# Patient Record
Sex: FEMALE | Race: WHITE | NOT HISPANIC OR LATINO
[De-identification: names, ages, dates, MRNs, and addresses within clinical notes are randomized per-mention and may not be internally consistent; named-entity substitution may affect disease eponyms.]

---

## 2018-01-23 PROBLEM — Z00.00 ENCOUNTER FOR PREVENTIVE HEALTH EXAMINATION: Status: ACTIVE | Noted: 2018-01-23

## 2018-01-25 ENCOUNTER — APPOINTMENT (OUTPATIENT)
Dept: HEART AND VASCULAR | Facility: CLINIC | Age: 68
End: 2018-01-25
Payer: COMMERCIAL

## 2018-01-25 VITALS
DIASTOLIC BLOOD PRESSURE: 70 MMHG | HEART RATE: 70 BPM | HEIGHT: 64 IN | SYSTOLIC BLOOD PRESSURE: 122 MMHG | BODY MASS INDEX: 23.9 KG/M2 | WEIGHT: 140 LBS

## 2018-01-25 DIAGNOSIS — Z92.21 PERSONAL HISTORY OF ANTINEOPLASTIC CHEMOTHERAPY: ICD-10-CM

## 2018-01-25 DIAGNOSIS — E78.5 HYPERLIPIDEMIA, UNSPECIFIED: ICD-10-CM

## 2018-01-25 DIAGNOSIS — C50.912 MALIGNANT NEOPLASM OF UNSPECIFIED SITE OF LEFT FEMALE BREAST: ICD-10-CM

## 2018-01-25 DIAGNOSIS — Z87.891 PERSONAL HISTORY OF NICOTINE DEPENDENCE: ICD-10-CM

## 2018-01-25 DIAGNOSIS — I10 ESSENTIAL (PRIMARY) HYPERTENSION: ICD-10-CM

## 2018-01-25 DIAGNOSIS — M81.0 AGE-RELATED OSTEOPOROSIS W/OUT CURRENT PATHOLOGICAL FRACTURE: ICD-10-CM

## 2018-01-25 DIAGNOSIS — K58.9 IRRITABLE BOWEL SYNDROME W/OUT DIARRHEA: ICD-10-CM

## 2018-01-25 DIAGNOSIS — I25.10 ATHEROSCLEROTIC HEART DISEASE OF NATIVE CORONARY ARTERY W/OUT ANGINA PECTORIS: ICD-10-CM

## 2018-01-25 DIAGNOSIS — T66.XXXA RADIATION SICKNESS, UNSPECIFIED, INITIAL ENCOUNTER: ICD-10-CM

## 2018-01-25 PROCEDURE — 93000 ELECTROCARDIOGRAM COMPLETE: CPT

## 2018-01-25 PROCEDURE — 82043 UR ALBUMIN QUANTITATIVE: CPT | Mod: QW

## 2018-01-25 PROCEDURE — 99205 OFFICE O/P NEW HI 60 MIN: CPT | Mod: 25

## 2018-01-29 LAB
CREAT SPEC-SCNC: 84 MG/DL
MICROALBUMIN 24H UR DL<=1MG/L-MCNC: 2.1 MG/DL
MICROALBUMIN/CREAT 24H UR-RTO: 25 MG/G

## 2018-02-05 ENCOUNTER — CLINICAL ADVICE (OUTPATIENT)
Age: 68
End: 2018-02-05

## 2018-02-06 ENCOUNTER — TRANSCRIPTION ENCOUNTER (OUTPATIENT)
Age: 68
End: 2018-02-06

## 2018-02-06 ENCOUNTER — OUTPATIENT (OUTPATIENT)
Dept: OUTPATIENT SERVICES | Facility: HOSPITAL | Age: 68
LOS: 1 days | End: 2018-02-06
Payer: COMMERCIAL

## 2018-02-06 DIAGNOSIS — I35.9 NONRHEUMATIC AORTIC VALVE DISORDER, UNSPECIFIED: ICD-10-CM

## 2018-02-06 DIAGNOSIS — I25.9 CHRONIC ISCHEMIC HEART DISEASE, UNSPECIFIED: ICD-10-CM

## 2018-02-06 PROCEDURE — 93306 TTE W/DOPPLER COMPLETE: CPT

## 2018-02-06 PROCEDURE — A9500: CPT

## 2018-02-06 PROCEDURE — 93016 CV STRESS TEST SUPVJ ONLY: CPT

## 2018-02-06 PROCEDURE — 93306 TTE W/DOPPLER COMPLETE: CPT | Mod: 26

## 2018-02-06 PROCEDURE — 93017 CV STRESS TEST TRACING ONLY: CPT

## 2018-02-06 PROCEDURE — 78452 HT MUSCLE IMAGE SPECT MULT: CPT | Mod: 26

## 2018-02-06 PROCEDURE — 93018 CV STRESS TEST I&R ONLY: CPT

## 2018-02-06 PROCEDURE — A9505: CPT

## 2018-02-06 PROCEDURE — 78452 HT MUSCLE IMAGE SPECT MULT: CPT

## 2018-02-14 ENCOUNTER — APPOINTMENT (OUTPATIENT)
Dept: HEART AND VASCULAR | Facility: CLINIC | Age: 68
End: 2018-02-14

## 2018-02-14 ENCOUNTER — APPOINTMENT (OUTPATIENT)
Dept: CARDIOTHORACIC SURGERY | Facility: CLINIC | Age: 68
End: 2018-02-14
Payer: COMMERCIAL

## 2018-02-14 VITALS
DIASTOLIC BLOOD PRESSURE: 72 MMHG | RESPIRATION RATE: 19 BRPM | OXYGEN SATURATION: 95 % | HEIGHT: 63 IN | SYSTOLIC BLOOD PRESSURE: 131 MMHG | WEIGHT: 138 LBS | HEART RATE: 88 BPM | TEMPERATURE: 97.7 F | BODY MASS INDEX: 24.45 KG/M2

## 2018-02-14 VITALS
SYSTOLIC BLOOD PRESSURE: 131 MMHG | HEIGHT: 63 IN | BODY MASS INDEX: 23.39 KG/M2 | HEART RATE: 88 BPM | RESPIRATION RATE: 19 BRPM | WEIGHT: 132 LBS | DIASTOLIC BLOOD PRESSURE: 72 MMHG | OXYGEN SATURATION: 95 %

## 2018-02-14 DIAGNOSIS — Z86.39 PERSONAL HISTORY OF OTHER ENDOCRINE, NUTRITIONAL AND METABOLIC DISEASE: ICD-10-CM

## 2018-02-14 DIAGNOSIS — Q21.1 ATRIAL SEPTAL DEFECT: ICD-10-CM

## 2018-02-14 DIAGNOSIS — R73.03 PREDIABETES.: ICD-10-CM

## 2018-02-14 DIAGNOSIS — I50.32 CHRONIC DIASTOLIC (CONGESTIVE) HEART FAILURE: ICD-10-CM

## 2018-02-14 DIAGNOSIS — Z87.19 PERSONAL HISTORY OF OTHER DISEASES OF THE DIGESTIVE SYSTEM: ICD-10-CM

## 2018-02-14 DIAGNOSIS — I35.0 NONRHEUMATIC AORTIC (VALVE) STENOSIS: ICD-10-CM

## 2018-02-14 DIAGNOSIS — Z82.49 FAMILY HISTORY OF ISCHEMIC HEART DISEASE AND OTHER DISEASES OF THE CIRCULATORY SYSTEM: ICD-10-CM

## 2018-02-14 DIAGNOSIS — Z87.39 PERSONAL HISTORY OF OTHER DISEASES OF THE MUSCULOSKELETAL SYSTEM AND CONNECTIVE TISSUE: ICD-10-CM

## 2018-02-14 DIAGNOSIS — Z86.79 PERSONAL HISTORY OF OTHER DISEASES OF THE CIRCULATORY SYSTEM: ICD-10-CM

## 2018-02-14 DIAGNOSIS — Z78.9 OTHER SPECIFIED HEALTH STATUS: ICD-10-CM

## 2018-02-14 PROCEDURE — 99205 OFFICE O/P NEW HI 60 MIN: CPT

## 2018-02-14 PROCEDURE — 99204 OFFICE O/P NEW MOD 45 MIN: CPT

## 2018-02-14 RX ORDER — ANASTROZOLE TABLETS 1 MG/1
1 TABLET ORAL DAILY
Refills: 0 | Status: ACTIVE | COMMUNITY

## 2018-02-15 PROBLEM — I50.32 CHRONIC DIASTOLIC CONGESTIVE HEART FAILURE: Status: ACTIVE | Noted: 2018-02-15

## 2018-02-21 ENCOUNTER — FORM ENCOUNTER (OUTPATIENT)
Age: 68
End: 2018-02-21

## 2018-02-21 RX ORDER — CHLORHEXIDINE GLUCONATE 213 G/1000ML
1 SOLUTION TOPICAL ONCE
Qty: 0 | Refills: 0 | Status: DISCONTINUED | OUTPATIENT
Start: 2018-02-22 | End: 2018-03-09

## 2018-02-21 NOTE — PROGRESS NOTE ADULT - SUBJECTIVE AND OBJECTIVE BOX
[facility] Interventional Cardiology Addendum Note to Structural Heart AMBI H&P:     Attending MD: Dr Miguel Barr        S: Pt presents for Right and Left Heart Catheterization (see office H&P for detailed History).  Pt reports that today she feels ...............        Allergies      Intolerances        Current Medications:       PHYSICAL EXAM    V/S		BP:		        HR:	           RR: 		  TEMP:     General:   HEENT: NCAT, EOMI, PERRLA  NECK: No JVD, No carotid bruits B/L, +2 Carotid pulses B/L  PULM:  CTA B/L No W/R/R  CARD: RRR/Irreg, +S1 +S2,  M/R/G  ABD: ND, +BS, NT, no masses  EXT: Warm, No pedal edema  NEURO: A & O x 3, no focal neurologic deficits  PULSES:	     B	          R	      	  FEM          		           DP        PT       Right              			  No/Yes	        Bruit	          Left       	         		  No/Yes	        Bruit	   		                                                              LABS:                        EKG:    ASA _____				Mallampati class: _________	    A/P:          Sedation Plan:   ? None   ? Moderate    ?  Deep    ?  General Anesthesia   Patient Is Suitable Candidate For Sedation?     ? Yes   ? No   ? Not Applicable     Risks & benefits of procedure and sedation and risks and benefits for the alternative therapy have been explained to the patient in layman’s terms including but not limited to: allergic reaction, bleeding, infection, arrhythmia, respiratory compromise, renal and vascular compromise, limb damage, MI, CVA, emergent CABG/Vascular Surgery and death. Informed consent obtained and in chart. [facility] Interventional Cardiology Addendum Note to Structural Heart AMBI H&P:     Attending MD: Dr Miguel Barr        S:   History obtained via patient and office note     66 y/o female with FHx heart disease and PMH of  HTN, HLD, Left breast cancer s/p lumpectomy, chemotherapy, radiation to the breast/chest in 2015 (currently on oral chemotherapy), chronic diastolic heart failure with moderate to severe aortic stenosis who was referred to Dr. Barr for further evaluation of her valvular disease. The patient states for the last few months, she has noticed a slight decrease in her stamina and exercise tolerance. She is not her normal active self and feels slightly fatigue. The patient denies any overt SOB with exertion, SOB at rest, chest pain, palpitations, dizziness, syncope or LE edema. The patient recently had an ECHO on 2/6/18 which showed moderate to severe AS with AMELIA 1.0 cm2 and a mean gradient of 31 mmHg (which is increased from 21 mmHG from ECHO in 12/17). The patient also had a calcium score in 2016 which showed calcium score of 1499. In light of pt. symptoms and increase in her mean gradient from December, pt. presents for  R amd L heart cardiac catheterization for invasive measurement of the aortic valve gradient as well as assessing her coronary arteries due to her high calcium score.         Allergies      Intolerances        Current Medications:       PHYSICAL EXAM    V/S		BP:		        HR:	           RR: 		  TEMP:     General:   HEENT: NCAT, EOMI, PERRLA  NECK: No JVD, No carotid bruits B/L, +2 Carotid pulses B/L  PULM:  CTA B/L No W/R/R  CARD: RRR/Irreg, +S1 +S2,  M/R/G  ABD: ND, +BS, NT, no masses  EXT: Warm, No pedal edema  NEURO: A & O x 3, no focal neurologic deficits  PULSES:	     B	          R	      	  FEM          		           DP        PT       Right              			  No/Yes	        Bruit	          Left       	         		  No/Yes	        Bruit	   		                                                              LABS:                        EKG:    ASA _____				Mallampati class: _________	    A/P:          Sedation Plan:   ? None   ? Moderate    ?  Deep    ?  General Anesthesia   Patient Is Suitable Candidate For Sedation?     ? Yes   ? No   ? Not Applicable     Risks & benefits of procedure and sedation and risks and benefits for the alternative therapy have been explained to the patient in layman’s terms including but not limited to: allergic reaction, bleeding, infection, arrhythmia, respiratory compromise, renal and vascular compromise, limb damage, MI, CVA, emergent CABG/Vascular Surgery and death. Informed consent obtained and in chart. [facility] Interventional Cardiology Addendum Note to Structural Heart AMBI H&P:     Attending MD: Dr Miguel Barr        S:   History obtained via patient and office note     68 y/o female former smoker with FHx heart disease and PMH of  HTN, HLD, Left breast cancer s/p lumpectomy, chemotherapy, radiation to the breast/chest in 2015 (currently on oral chemotherapy), chronic diastolic heart failure with moderate to severe aortic stenosis who was referred to Dr. Barr for further evaluation of her valvular disease. The patient states for the last few months, she has noticed a slight decrease in her stamina and exercise tolerance. She is not her normal active self and feels slightly fatigue. The patient denies any overt SOB with exertion, SOB at rest, chest pain, palpitations, dizziness, syncope or LE edema. The patient recently had an ECHO on 2/6/18 which showed moderate to severe AS with AMELIA 1.0 cm2 and a mean gradient of 31 mmHg (which is increased from 21 mmHG from ECHO in 12/17). The patient also had a calcium score in 2016 which showed calcium score of 1499. In light of pt. symptoms and increase in her mean gradient from December, pt. presents for recommended R amd L heart cardiac catheterization for invasive measurement of the aortic valve gradient as well as assessing her coronary arteries due to her high calcium score.         Allergies: NKDA    Current Medications:   - Bystolic 5 mg 1 X daily  - Anastrazole 1 mg 1 X daily  - Crestor 40 mg 1 X daily  - Zetia 10 mg 1 X daily  - amitryptyline 10 mg 1 X daily   - amlodipine 10 mg 1 X daily      PHYSICAL EXAM    V/S		BP:	119/59	        HR:	76           RR: 	16	  TEMP: 97.9    General: NAD  Neck: no JVD noted  CV: RRR. S1 and S2 present; 3/6 MINDA  Pulm: CTA B/L, no W/R/R  GI: soft, NT/ND, + BS X 4  extremities: no clubbing, cyanosis and edema noted  Neuro: AO x 3, no focal deficits noted    PULSES:	     B	          R	      	  FEM          		           DP        PT       Right          2+    		2+	2+  No	        Bruit	   2+                 1+       Left       	 2+        	2+	2+   No	        Bruit	   2+		1+                                                              LABS:                          13.2   5.7   )-----------( 202      ( 22 Feb 2018 11:45 )             39.6   02-22    142  |  103  |  20  ----------------------------<  107<H>  4.9   |  23  |  1.05    Ca    9.7      22 Feb 2018 11:45        PT/INR - ( 22 Feb 2018 11:45 )   PT: 10.7 sec;   INR: 0.96          PTT - ( 22 Feb 2018 11:45 )  PTT:26.6 sec                EKG:    ASA ___3__				Mallampati class: ____3_____	    A/P:    68 y/o female former smoker with FHx heart disease and PMH of  HTN, HLD, Left breast cancer s/p lumpectomy, chemotherapy, radiation to the breast/chest in 2015 (currently on oral chemotherapy), chronic diastolic heart failure with moderate to severe aortic stenosis who was referred to Dr. Barr for further evaluation of her valvular disease. In light of pt. symptoms and increase in her mean gradient from December, pt. presents for recommended R amd L heart cardiac catheterization for invasive measurement of the aortic valve gradient as well as assessing her coronary arteries due to her high calcium score.     Pt. loaded with ASA 81 mg PO X 1 and Plavix 300 mg PO X 1 pre-cath as per Dr. Barr.      Sedation Plan:   ? None   ? Moderate    ?  Deep    ?  General Anesthesia   Patient Is Suitable Candidate For Sedation?     ? Yes   ? No   ? Not Applicable     Risks & benefits of procedure and sedation and risks and benefits for the alternative therapy have been explained to the patient in layman’s terms including but not limited to: allergic reaction, bleeding, infection, arrhythmia, respiratory compromise, renal and vascular compromise, limb damage, MI, CVA, emergent CABG/Vascular Surgery and death. Informed consent obtained and in chart. [facility] Interventional Cardiology Addendum Note to Structural Heart AMBI H&P:     Attending MD: Dr Miguel Barr        S:   History obtained via patient and office note     66 y/o female former smoker with FHx heart disease and PMH of  HTN, HLD, Left breast cancer s/p lumpectomy, chemotherapy, radiation to the breast/chest in 2015 (currently on oral chemotherapy), chronic diastolic heart failure with moderate to severe aortic stenosis who was referred to Dr. Barr for further evaluation of her valvular disease. The patient states for the last few months, she has noticed a slight decrease in her stamina and exercise tolerance. She is not her normal active self and feels slightly fatigue. The patient denies any overt SOB with exertion, SOB at rest, chest pain, palpitations, dizziness, syncope or LE edema. The patient recently had an ECHO on 2/6/18 which showed moderate to severe AS with AMELIA 1.0 cm2 and a mean gradient of 31 mmHg (which is increased from 21 mmHG from ECHO in 12/17). The patient also had a calcium score in 2016 which showed calcium score of 1499. In light of pt. symptoms and increase in her mean gradient from December, pt. presents for recommended R amd L heart cardiac catheterization for invasive measurement of the aortic valve gradient as well as assessing her coronary arteries due to her high calcium score.         Allergies: NKDA    Current Medications:   - Bystolic 5 mg 1 X daily  - Anastrazole 1 mg 1 X daily  - Crestor 40 mg 1 X daily  - Zetia 10 mg 1 X daily  - amitryptyline 10 mg 1 X daily   - amlodipine 10 mg 1 X daily      PHYSICAL EXAM    V/S		BP:	119/59	        HR:	76           RR: 	16	  TEMP: 97.9    General: NAD  Neck: no JVD noted  CV: RRR. S1 and S2 present; 3/6 MINDA  Pulm: CTA B/L, no W/R/R  GI: soft, NT/ND, + BS X 4  extremities: no clubbing, cyanosis and edema noted  Neuro: AO x 3, no focal deficits noted    PULSES:	     B	          R	      	  FEM          		           DP        PT       Right          2+    		2+	2+  No	        Bruit	   2+                 1+       Left       	 2+        	2+	2+   No	        Bruit	   2+		1+                                                              LABS:                          13.2   5.7   )-----------( 202      ( 22 Feb 2018 11:45 )             39.6   02-22    142  |  103  |  20  ----------------------------<  107<H>  4.9   |  23  |  1.05    Ca    9.7      22 Feb 2018 11:45        PT/INR - ( 22 Feb 2018 11:45 )   PT: 10.7 sec;   INR: 0.96          PTT - ( 22 Feb 2018 11:45 )  PTT:26.6 sec                EKG: NSR @ 74 bpm, no ST changes noted.     ASA ___3__				Mallampati class: ____3_____	    A/P:    66 y/o female former smoker with FHx heart disease and PMH of  HTN, HLD, Left breast cancer s/p lumpectomy, chemotherapy, radiation to the breast/chest in 2015 (currently on oral chemotherapy), chronic diastolic heart failure with moderate to severe aortic stenosis who was referred to Dr. Barr for further evaluation of her valvular disease. In light of pt. symptoms and increase in her mean gradient from December, pt. presents for recommended R amd L heart cardiac catheterization for invasive measurement of the aortic valve gradient as well as assessing her coronary arteries due to her high calcium score.     Pt. loaded with ASA 81 mg PO X 1 and Plavix 300 mg PO X 1 pre-cath as per Dr. Barr.      Sedation Plan:   ? None   ? Moderate    ?  Deep    ?  General Anesthesia   Patient Is Suitable Candidate For Sedation?     ? Yes   ? No   ? Not Applicable     Risks & benefits of procedure and sedation and risks and benefits for the alternative therapy have been explained to the patient in layman’s terms including but not limited to: allergic reaction, bleeding, infection, arrhythmia, respiratory compromise, renal and vascular compromise, limb damage, MI, CVA, emergent CABG/Vascular Surgery and death. Informed consent obtained and in chart.

## 2018-02-22 ENCOUNTER — APPOINTMENT (OUTPATIENT)
Dept: CARDIOTHORACIC SURGERY | Facility: HOSPITAL | Age: 68
End: 2018-02-22
Payer: COMMERCIAL

## 2018-02-22 ENCOUNTER — OUTPATIENT (OUTPATIENT)
Dept: OUTPATIENT SERVICES | Facility: HOSPITAL | Age: 68
LOS: 1 days | Discharge: HOME | End: 2018-02-22
Payer: COMMERCIAL

## 2018-02-22 VITALS
TEMPERATURE: 99 F | SYSTOLIC BLOOD PRESSURE: 119 MMHG | DIASTOLIC BLOOD PRESSURE: 59 MMHG | OXYGEN SATURATION: 97 % | WEIGHT: 136.03 LBS | HEART RATE: 76 BPM | HEIGHT: 63 IN | RESPIRATION RATE: 16 BRPM

## 2018-02-22 DIAGNOSIS — Z98.890 OTHER SPECIFIED POSTPROCEDURAL STATES: Chronic | ICD-10-CM

## 2018-02-22 DIAGNOSIS — I89.9 NONINFECTIVE DISORDER OF LYMPHATIC VESSELS AND LYMPH NODES, UNSPECIFIED: Chronic | ICD-10-CM

## 2018-02-22 LAB
ANION GAP SERPL CALC-SCNC: 16 MMOL/L — SIGNIFICANT CHANGE UP (ref 5–17)
APTT BLD: 26.6 SEC — LOW (ref 27.5–37.4)
BASOPHILS NFR BLD AUTO: 0.7 % — SIGNIFICANT CHANGE UP (ref 0–2)
BUN SERPL-MCNC: 20 MG/DL — SIGNIFICANT CHANGE UP (ref 7–23)
CALCIUM SERPL-MCNC: 9.7 MG/DL — SIGNIFICANT CHANGE UP (ref 8.4–10.5)
CHLORIDE SERPL-SCNC: 103 MMOL/L — SIGNIFICANT CHANGE UP (ref 96–108)
CHOLEST SERPL-MCNC: 136 MG/DL — SIGNIFICANT CHANGE UP (ref 10–199)
CO2 SERPL-SCNC: 23 MMOL/L — SIGNIFICANT CHANGE UP (ref 22–31)
CREAT SERPL-MCNC: 1.05 MG/DL — SIGNIFICANT CHANGE UP (ref 0.5–1.3)
CRP SERPL-MCNC: <0.03 MG/DL — SIGNIFICANT CHANGE UP (ref 0–0.4)
EOSINOPHIL NFR BLD AUTO: 3.1 % — SIGNIFICANT CHANGE UP (ref 0–6)
GLUCOSE SERPL-MCNC: 107 MG/DL — HIGH (ref 70–99)
HBA1C BLD-MCNC: 6 % — HIGH (ref 4–5.6)
HCT VFR BLD CALC: 39.6 % — SIGNIFICANT CHANGE UP (ref 34.5–45)
HDLC SERPL-MCNC: 94 MG/DL — SIGNIFICANT CHANGE UP (ref 40–125)
HGB BLD-MCNC: 13.2 G/DL — SIGNIFICANT CHANGE UP (ref 11.5–15.5)
INR BLD: 0.96 — SIGNIFICANT CHANGE UP (ref 0.88–1.16)
LIPID PNL WITH DIRECT LDL SERPL: 31 MG/DL — SIGNIFICANT CHANGE UP
LYMPHOCYTES # BLD AUTO: 26.4 % — SIGNIFICANT CHANGE UP (ref 13–44)
MCHC RBC-ENTMCNC: 30.5 PG — SIGNIFICANT CHANGE UP (ref 27–34)
MCHC RBC-ENTMCNC: 33.3 G/DL — SIGNIFICANT CHANGE UP (ref 32–36)
MCV RBC AUTO: 91.5 FL — SIGNIFICANT CHANGE UP (ref 80–100)
MONOCYTES NFR BLD AUTO: 12.4 % — SIGNIFICANT CHANGE UP (ref 2–14)
NEUTROPHILS NFR BLD AUTO: 57.4 % — SIGNIFICANT CHANGE UP (ref 43–77)
PLATELET # BLD AUTO: 202 K/UL — SIGNIFICANT CHANGE UP (ref 150–400)
POTASSIUM SERPL-MCNC: 4.9 MMOL/L — SIGNIFICANT CHANGE UP (ref 3.5–5.3)
POTASSIUM SERPL-SCNC: 4.9 MMOL/L — SIGNIFICANT CHANGE UP (ref 3.5–5.3)
PROTHROM AB SERPL-ACNC: 10.7 SEC — SIGNIFICANT CHANGE UP (ref 9.8–12.7)
RBC # BLD: 4.33 M/UL — SIGNIFICANT CHANGE UP (ref 3.8–5.2)
RBC # FLD: 14.8 % — SIGNIFICANT CHANGE UP (ref 10.3–16.9)
SODIUM SERPL-SCNC: 142 MMOL/L — SIGNIFICANT CHANGE UP (ref 135–145)
TOTAL CHOLESTEROL/HDL RATIO MEASUREMENT: 1.4 RATIO — LOW (ref 3.3–7.1)
TRIGL SERPL-MCNC: 54 MG/DL — SIGNIFICANT CHANGE UP (ref 10–149)
WBC # BLD: 5.7 K/UL — SIGNIFICANT CHANGE UP (ref 3.8–10.5)
WBC # FLD AUTO: 5.7 K/UL — SIGNIFICANT CHANGE UP (ref 3.8–10.5)

## 2018-02-22 PROCEDURE — 93880 EXTRACRANIAL BILAT STUDY: CPT

## 2018-02-22 PROCEDURE — C1769: CPT

## 2018-02-22 PROCEDURE — C1894: CPT

## 2018-02-22 PROCEDURE — 83036 HEMOGLOBIN GLYCOSYLATED A1C: CPT

## 2018-02-22 PROCEDURE — 80048 BASIC METABOLIC PNL TOTAL CA: CPT

## 2018-02-22 PROCEDURE — C1760: CPT

## 2018-02-22 PROCEDURE — 74174 CTA ABD&PLVS W/CONTRAST: CPT

## 2018-02-22 PROCEDURE — 85025 COMPLETE CBC W/AUTO DIFF WBC: CPT

## 2018-02-22 PROCEDURE — 75573 CT HRT C+ STRUX CGEN HRT DS: CPT | Mod: 26

## 2018-02-22 PROCEDURE — 80061 LIPID PANEL: CPT

## 2018-02-22 PROCEDURE — 93005 ELECTROCARDIOGRAM TRACING: CPT

## 2018-02-22 PROCEDURE — 36415 COLL VENOUS BLD VENIPUNCTURE: CPT

## 2018-02-22 PROCEDURE — 85610 PROTHROMBIN TIME: CPT

## 2018-02-22 PROCEDURE — 75573 CT HRT C+ STRUX CGEN HRT DS: CPT

## 2018-02-22 PROCEDURE — 93880 EXTRACRANIAL BILAT STUDY: CPT | Mod: 26

## 2018-02-22 PROCEDURE — 74174 CTA ABD&PLVS W/CONTRAST: CPT | Mod: 26

## 2018-02-22 PROCEDURE — 86140 C-REACTIVE PROTEIN: CPT

## 2018-02-22 PROCEDURE — 93456 R HRT CORONARY ARTERY ANGIO: CPT

## 2018-02-22 PROCEDURE — C1887: CPT

## 2018-02-22 PROCEDURE — 93460 R&L HRT ART/VENTRICLE ANGIO: CPT | Mod: 26

## 2018-02-22 PROCEDURE — 93010 ELECTROCARDIOGRAM REPORT: CPT

## 2018-02-22 PROCEDURE — 85730 THROMBOPLASTIN TIME PARTIAL: CPT

## 2018-02-22 NOTE — PROGRESS NOTE ADULT - SUBJECTIVE AND OBJECTIVE BOX
Interventional Cardiology PA SDA Discharge Note    Patient without complaints. Ambulated and voided without difficulties    Afebrile, VSS    Ext:    		Right Groin:   No    hematoma,  No   bruit, dressing; C/D/I  	      Pulses:    intact DP/PT to baseline     A/P:    68 y/o female former smoker with FHx heart disease and PMH of  HTN, HLD, Left breast cancer s/p lumpectomy, chemotherapy, radiation to the breast/chest in 2015 (currently on oral chemotherapy), chronic diastolic heart failure with moderate to severe aortic stenosis who was referred to Dr. Barr for further evaluation of her valvular disease. The patient states for the last few months, she has noticed a slight decrease in her stamina and exercise tolerance. She is not her normal active self and feels slightly fatigue. The patient denies any overt SOB with exertion, SOB at rest, chest pain, palpitations, dizziness, syncope or LE edema. The patient recently had an ECHO on 2/6/18 which showed moderate to severe AS with AMELIA 1.0 cm2 and a mean gradient of 31 mmHg (which is increased from 21 mmHG from ECHO in 12/17). The patient also had a calcium score in 2016 which showed calcium score of 1499. In light of pt. symptoms and increase in her mean gradient from December, pt. presents for recommended R amd L heart cardiac catheterization for invasive measurement of the aortic valve gradient as well as assessing her coronary arteries due to her high calcium score. Pt s/p diagnostic R and L heart cath 2/22/2018 showing non obstructive CAD and severe aortic stenosis mean gradient 32-43 mmHg. Pt stable for discharge after pre-TAVR work up completed: CTA abdomen/pelvis, CT heart congenital, and US carotids.                 1.	Stable for discharge as per attending Dr. Barr after bed rest, pt voids, groin stable, 30 minutes of ambulation, and pre-op TAVR imaging complete.  2.	Follow-up with PMD/Cardiologist Dr. Barr in 1-2 weeks  3.	Discharged forms signed and copies in chart Interventional Cardiology PA SDA Discharge Note    Patient without complaints. Ambulated and voided without difficulties    Afebrile, VSS    Ext:    		Right Groin:   No    hematoma,  No   bruit, dressing; C/D/I  	      Pulses:    intact DP/PT to baseline     A/P:    68 y/o female former smoker with FHx heart disease and PMH of  HTN, HLD, Left breast cancer s/p lumpectomy, chemotherapy, radiation to the breast/chest in 2015 (currently on oral chemotherapy), chronic diastolic heart failure with moderate to severe aortic stenosis who was referred to Dr. Barr for further evaluation of her valvular disease. The patient states for the last few months, she has noticed a slight decrease in her stamina and exercise tolerance. She is not her normal active self and feels slightly fatigue. The patient denies any overt SOB with exertion, SOB at rest, chest pain, palpitations, dizziness, syncope or LE edema. The patient recently had an ECHO on 2/6/18 which showed moderate to severe AS with AMELIA 1.0 cm2 and a mean gradient of 31 mmHg (which is increased from 21 mmHG from ECHO in 12/17). The patient also had a calcium score in 2016 which showed calcium score of 1499. In light of pt. symptoms and increase in her mean gradient from December, pt. presents for recommended R amd L heart cardiac catheterization for invasive measurement of the aortic valve gradient as well as assessing her coronary arteries due to her high calcium score. Pt s/p diagnostic R and L heart cath 2/22/2018 showing non obstructive CAD and moderate-severe aortic stenosis mean gradient 32 mmHg. Pt stable for discharge after pre-TAVR work up completed: CTA abdomen/pelvis, CT heart congenital, and US carotids.                 1.	Stable for discharge as per attending Dr. Barr after bed rest, pt voids, groin stable, 30 minutes of ambulation, and pre-op TAVR imaging complete.  2.	Follow-up with PMD/Cardiologist Dr. Barr in 1-2 weeks  3.	Discharged forms signed and copies in chart

## 2018-02-22 NOTE — ASU PATIENT PROFILE, ADULT - PSH
History of blepharoplasty    History of lumpectomy of left breast    Lymph node disorder  left axillary node disection x 2.5 yrs.

## 2018-03-05 DIAGNOSIS — I50.22 CHRONIC SYSTOLIC (CONGESTIVE) HEART FAILURE: ICD-10-CM

## 2018-03-05 DIAGNOSIS — Z92.21 PERSONAL HISTORY OF ANTINEOPLASTIC CHEMOTHERAPY: ICD-10-CM

## 2018-03-05 DIAGNOSIS — Z92.3 PERSONAL HISTORY OF IRRADIATION: ICD-10-CM

## 2018-03-05 DIAGNOSIS — E78.5 HYPERLIPIDEMIA, UNSPECIFIED: ICD-10-CM

## 2018-03-05 DIAGNOSIS — I35.0 NONRHEUMATIC AORTIC (VALVE) STENOSIS: ICD-10-CM

## 2018-03-05 DIAGNOSIS — Z85.3 PERSONAL HISTORY OF MALIGNANT NEOPLASM OF BREAST: ICD-10-CM

## 2018-03-05 DIAGNOSIS — Z79.82 LONG TERM (CURRENT) USE OF ASPIRIN: ICD-10-CM

## 2018-03-05 DIAGNOSIS — I25.10 ATHEROSCLEROTIC HEART DISEASE OF NATIVE CORONARY ARTERY WITHOUT ANGINA PECTORIS: ICD-10-CM

## 2018-03-05 DIAGNOSIS — I11.0 HYPERTENSIVE HEART DISEASE WITH HEART FAILURE: ICD-10-CM

## 2018-03-05 DIAGNOSIS — Z87.891 PERSONAL HISTORY OF NICOTINE DEPENDENCE: ICD-10-CM

## 2018-03-07 ENCOUNTER — APPOINTMENT (OUTPATIENT)
Dept: CARDIOTHORACIC SURGERY | Facility: CLINIC | Age: 68
End: 2018-03-07

## 2018-03-26 ENCOUNTER — APPOINTMENT (OUTPATIENT)
Dept: HEART AND VASCULAR | Facility: CLINIC | Age: 68
End: 2018-03-26

## 2018-04-17 ENCOUNTER — RX RENEWAL (OUTPATIENT)
Age: 68
End: 2018-04-17

## 2018-04-26 PROBLEM — Z01.818 PREOPERATIVE CLEARANCE: Status: ACTIVE | Noted: 2018-04-26

## 2018-05-03 ENCOUNTER — APPOINTMENT (OUTPATIENT)
Dept: HEART AND VASCULAR | Facility: CLINIC | Age: 68
End: 2018-05-03
Payer: COMMERCIAL

## 2018-05-03 VITALS
DIASTOLIC BLOOD PRESSURE: 80 MMHG | OXYGEN SATURATION: 98 % | SYSTOLIC BLOOD PRESSURE: 130 MMHG | HEIGHT: 63 IN | WEIGHT: 133 LBS | BODY MASS INDEX: 23.57 KG/M2 | HEART RATE: 67 BPM

## 2018-05-03 DIAGNOSIS — Z01.818 ENCOUNTER FOR OTHER PREPROCEDURAL EXAMINATION: ICD-10-CM

## 2018-05-03 PROCEDURE — 99214 OFFICE O/P EST MOD 30 MIN: CPT | Mod: 25

## 2018-05-03 PROCEDURE — 36415 COLL VENOUS BLD VENIPUNCTURE: CPT

## 2018-05-07 LAB
ALBUMIN SERPL ELPH-MCNC: 4.8 G/DL
ALP BLD-CCNC: 68 U/L
ALT SERPL-CCNC: 59 U/L
ANION GAP SERPL CALC-SCNC: 15 MMOL/L
AST SERPL-CCNC: 48 U/L
BASOPHILS # BLD AUTO: 0.03 K/UL
BASOPHILS NFR BLD AUTO: 0.4 %
BILIRUB SERPL-MCNC: 0.3 MG/DL
BUN SERPL-MCNC: 19 MG/DL
CALCIUM SERPL-MCNC: 9.7 MG/DL
CHLORIDE SERPL-SCNC: 106 MMOL/L
CHOLEST SERPL-MCNC: 129 MG/DL
CHOLEST/HDLC SERPL: 1.8 RATIO
CO2 SERPL-SCNC: 24 MMOL/L
CREAT SERPL-MCNC: 0.97 MG/DL
EOSINOPHIL # BLD AUTO: 0.27 K/UL
EOSINOPHIL NFR BLD AUTO: 4 %
GLUCOSE SERPL-MCNC: 104 MG/DL
HBA1C MFR BLD HPLC: 6.1 %
HCT VFR BLD CALC: 39.8 %
HDLC SERPL-MCNC: 73 MG/DL
HGB BLD-MCNC: 12.4 G/DL
IMM GRANULOCYTES NFR BLD AUTO: 0.4 %
LDLC SERPL CALC-MCNC: 35 MG/DL
LYMPHOCYTES # BLD AUTO: 1.53 K/UL
LYMPHOCYTES NFR BLD AUTO: 22.5 %
MAN DIFF?: NORMAL
MCHC RBC-ENTMCNC: 30.5 PG
MCHC RBC-ENTMCNC: 31.2 GM/DL
MCV RBC AUTO: 98 FL
MONOCYTES # BLD AUTO: 0.72 K/UL
MONOCYTES NFR BLD AUTO: 10.6 %
NEUTROPHILS # BLD AUTO: 4.23 K/UL
NEUTROPHILS NFR BLD AUTO: 62.1 %
PLATELET # BLD AUTO: 263 K/UL
POTASSIUM SERPL-SCNC: 5.4 MMOL/L
PROT SERPL-MCNC: 7.9 G/DL
RBC # BLD: 4.06 M/UL
RBC # FLD: 15.9 %
SODIUM SERPL-SCNC: 145 MMOL/L
TRIGL SERPL-MCNC: 106 MG/DL
WBC # FLD AUTO: 6.81 K/UL

## 2018-05-31 ENCOUNTER — APPOINTMENT (OUTPATIENT)
Dept: HEART AND VASCULAR | Facility: CLINIC | Age: 68
End: 2018-05-31
Payer: COMMERCIAL

## 2018-05-31 VITALS
BODY MASS INDEX: 22.86 KG/M2 | DIASTOLIC BLOOD PRESSURE: 82 MMHG | WEIGHT: 129 LBS | HEIGHT: 63 IN | HEART RATE: 84 BPM | SYSTOLIC BLOOD PRESSURE: 138 MMHG

## 2018-05-31 DIAGNOSIS — Z95.3 PRESENCE OF XENOGENIC HEART VALVE: ICD-10-CM

## 2018-05-31 PROCEDURE — 93000 ELECTROCARDIOGRAM COMPLETE: CPT

## 2018-05-31 PROCEDURE — 99213 OFFICE O/P EST LOW 20 MIN: CPT | Mod: 25

## 2018-05-31 RX ORDER — EZETIMIBE 10 MG/1
10 TABLET ORAL DAILY
Qty: 90 | Refills: 1 | Status: DISCONTINUED | COMMUNITY
End: 2018-05-31

## 2018-05-31 RX ORDER — AMLODIPINE BESYLATE 10 MG/1
10 TABLET ORAL DAILY
Qty: 1 | Refills: 0 | Status: DISCONTINUED | COMMUNITY
End: 2018-05-31

## 2018-05-31 RX ORDER — NEBIVOLOL HYDROCHLORIDE 5 MG/1
5 TABLET ORAL DAILY
Qty: 90 | Refills: 1 | Status: DISCONTINUED | COMMUNITY
End: 2018-05-31

## 2018-06-04 PROBLEM — Z95.3 HISTORY OF AORTIC VALVE REPLACEMENT WITH BIOPROSTHETIC VALVE: Status: ACTIVE | Noted: 2018-06-04

## 2018-06-05 ENCOUNTER — OUTPATIENT (OUTPATIENT)
Dept: OUTPATIENT SERVICES | Facility: HOSPITAL | Age: 68
LOS: 1 days | End: 2018-06-05
Payer: COMMERCIAL

## 2018-06-05 ENCOUNTER — APPOINTMENT (OUTPATIENT)
Dept: HEART AND VASCULAR | Facility: CLINIC | Age: 68
End: 2018-06-05

## 2018-06-05 DIAGNOSIS — I89.9 NONINFECTIVE DISORDER OF LYMPHATIC VESSELS AND LYMPH NODES, UNSPECIFIED: Chronic | ICD-10-CM

## 2018-06-05 DIAGNOSIS — Z98.890 OTHER SPECIFIED POSTPROCEDURAL STATES: Chronic | ICD-10-CM

## 2018-06-05 DIAGNOSIS — I35.0 NONRHEUMATIC AORTIC (VALVE) STENOSIS: ICD-10-CM

## 2018-06-05 PROCEDURE — 93306 TTE W/DOPPLER COMPLETE: CPT

## 2018-06-05 PROCEDURE — 93306 TTE W/DOPPLER COMPLETE: CPT | Mod: 26

## 2018-06-07 ENCOUNTER — APPOINTMENT (OUTPATIENT)
Dept: HEART AND VASCULAR | Facility: CLINIC | Age: 68
End: 2018-06-07
Payer: COMMERCIAL

## 2018-06-07 VITALS
HEIGHT: 63 IN | WEIGHT: 129 LBS | DIASTOLIC BLOOD PRESSURE: 80 MMHG | BODY MASS INDEX: 22.86 KG/M2 | HEART RATE: 75 BPM | SYSTOLIC BLOOD PRESSURE: 124 MMHG

## 2018-06-07 VITALS
WEIGHT: 129 LBS | BODY MASS INDEX: 22.86 KG/M2 | HEIGHT: 63 IN | DIASTOLIC BLOOD PRESSURE: 80 MMHG | SYSTOLIC BLOOD PRESSURE: 140 MMHG

## 2018-06-07 PROCEDURE — 99213 OFFICE O/P EST LOW 20 MIN: CPT

## 2018-06-28 ENCOUNTER — RX RENEWAL (OUTPATIENT)
Age: 68
End: 2018-06-28

## 2018-07-23 RX ORDER — ROSUVASTATIN CALCIUM 40 MG/1
40 TABLET, FILM COATED ORAL DAILY
Qty: 90 | Refills: 3 | Status: ACTIVE | COMMUNITY

## 2018-09-27 PROBLEM — I10 ESSENTIAL (PRIMARY) HYPERTENSION: Chronic | Status: ACTIVE | Noted: 2018-02-22

## 2018-09-27 PROBLEM — E78.5 HYPERLIPIDEMIA, UNSPECIFIED: Chronic | Status: ACTIVE | Noted: 2018-02-22

## 2018-10-04 ENCOUNTER — APPOINTMENT (OUTPATIENT)
Dept: HEART AND VASCULAR | Facility: CLINIC | Age: 68
End: 2018-10-04
Payer: COMMERCIAL

## 2018-10-04 ENCOUNTER — APPOINTMENT (OUTPATIENT)
Dept: MRI IMAGING | Facility: HOSPITAL | Age: 68
End: 2018-10-04

## 2018-10-04 ENCOUNTER — OUTPATIENT (OUTPATIENT)
Dept: OUTPATIENT SERVICES | Facility: HOSPITAL | Age: 68
LOS: 1 days | End: 2018-10-04
Payer: COMMERCIAL

## 2018-10-04 VITALS
WEIGHT: 131 LBS | HEART RATE: 72 BPM | BODY MASS INDEX: 23.21 KG/M2 | HEIGHT: 63 IN | SYSTOLIC BLOOD PRESSURE: 128 MMHG | DIASTOLIC BLOOD PRESSURE: 80 MMHG

## 2018-10-04 DIAGNOSIS — R42 DIZZINESS AND GIDDINESS: ICD-10-CM

## 2018-10-04 DIAGNOSIS — I31.3 PERICARDIAL EFFUSION (NONINFLAMMATORY): ICD-10-CM

## 2018-10-04 DIAGNOSIS — Z98.890 OTHER SPECIFIED POSTPROCEDURAL STATES: Chronic | ICD-10-CM

## 2018-10-04 DIAGNOSIS — I89.9 NONINFECTIVE DISORDER OF LYMPHATIC VESSELS AND LYMPH NODES, UNSPECIFIED: Chronic | ICD-10-CM

## 2018-10-04 PROCEDURE — A9585: CPT

## 2018-10-04 PROCEDURE — 99213 OFFICE O/P EST LOW 20 MIN: CPT

## 2018-10-04 PROCEDURE — 70553 MRI BRAIN STEM W/O & W/DYE: CPT | Mod: 26

## 2018-10-04 PROCEDURE — 70553 MRI BRAIN STEM W/O & W/DYE: CPT

## 2018-10-04 RX ORDER — ASPIRIN 325 MG/1
325 TABLET, FILM COATED ORAL
Refills: 0 | Status: DISCONTINUED | COMMUNITY
End: 2018-10-04

## 2018-10-04 RX ORDER — AMITRIPTYLINE HYDROCHLORIDE 10 MG/1
10 TABLET, FILM COATED ORAL
Qty: 30 | Refills: 0 | Status: DISCONTINUED | COMMUNITY
End: 2018-10-04

## 2018-10-05 ENCOUNTER — APPOINTMENT (OUTPATIENT)
Dept: MRI IMAGING | Facility: HOSPITAL | Age: 68
End: 2018-10-05

## 2019-01-10 ENCOUNTER — OUTPATIENT (OUTPATIENT)
Dept: OUTPATIENT SERVICES | Facility: HOSPITAL | Age: 69
LOS: 1 days | End: 2019-01-10
Payer: COMMERCIAL

## 2019-01-10 DIAGNOSIS — I25.10 ATHEROSCLEROTIC HEART DISEASE OF NATIVE CORONARY ARTERY WITHOUT ANGINA PECTORIS: ICD-10-CM

## 2019-01-10 DIAGNOSIS — I31.3 PERICARDIAL EFFUSION (NONINFLAMMATORY): ICD-10-CM

## 2019-01-10 DIAGNOSIS — I89.9 NONINFECTIVE DISORDER OF LYMPHATIC VESSELS AND LYMPH NODES, UNSPECIFIED: Chronic | ICD-10-CM

## 2019-01-10 DIAGNOSIS — I35.0 NONRHEUMATIC AORTIC (VALVE) STENOSIS: ICD-10-CM

## 2019-01-10 DIAGNOSIS — Z98.890 OTHER SPECIFIED POSTPROCEDURAL STATES: Chronic | ICD-10-CM

## 2019-01-10 PROCEDURE — 93306 TTE W/DOPPLER COMPLETE: CPT | Mod: 26

## 2019-01-10 PROCEDURE — 93306 TTE W/DOPPLER COMPLETE: CPT

## 2019-01-22 RX ORDER — EVOLOCUMAB 140 MG/ML
140 INJECTION, SOLUTION SUBCUTANEOUS
Qty: 2 | Refills: 2 | Status: ACTIVE | COMMUNITY
Start: 2018-02-01 | End: 1900-01-01

## 2019-02-07 ENCOUNTER — RX RENEWAL (OUTPATIENT)
Age: 69
End: 2019-02-07

## 2019-02-07 RX ORDER — METOPROLOL SUCCINATE 100 MG/1
100 TABLET, EXTENDED RELEASE ORAL
Qty: 90 | Refills: 1 | Status: ACTIVE | COMMUNITY
Start: 2018-05-31 | End: 1900-01-01

## 2019-02-25 ENCOUNTER — APPOINTMENT (OUTPATIENT)
Dept: HEART AND VASCULAR | Facility: CLINIC | Age: 69
End: 2019-02-25
Payer: COMMERCIAL

## 2019-02-25 VITALS
WEIGHT: 128 LBS | SYSTOLIC BLOOD PRESSURE: 146 MMHG | HEART RATE: 92 BPM | BODY MASS INDEX: 22.68 KG/M2 | HEIGHT: 63 IN | DIASTOLIC BLOOD PRESSURE: 76 MMHG

## 2019-02-25 PROCEDURE — 99213 OFFICE O/P EST LOW 20 MIN: CPT

## 2019-02-25 RX ORDER — AMOXICILLIN 500 MG/1
500 TABLET, FILM COATED ORAL
Qty: 4 | Refills: 0 | Status: ACTIVE | COMMUNITY
Start: 2018-10-08 | End: 1900-01-01

## 2019-02-25 RX ORDER — AMLODIPINE BESYLATE 5 MG/1
5 TABLET ORAL DAILY
Qty: 30 | Refills: 3 | Status: ACTIVE | COMMUNITY
Start: 2019-02-25 | End: 1900-01-01

## 2019-02-25 NOTE — HISTORY OF PRESENT ILLNESS
[FreeTextEntry1] : 66 y/o female with h/o cad, left breast cancer s/p lumpectomy/XRT/chemo \par , HL, family h/o CVD, AS, preDM, htn who presents for f/up today s/p AVR/ASD repair \par \par Noted to have recent elevation AST\par Sent for CT scan c/a/p - noted to have small lesions on liver\par She states BP elevated recently\par Also had cataract surgery a few weeks ago\par \par -Echo : normal lv size/thickness, abnl paradox septal motion c/w abnl conduction, ef 55%, bioprosthetic av, no ai, peak gradient 12, mean gradient 7, mac, trace mr, mild to mod tr, pasp 25, small effusion adj to RV \par \par Holter placed : avg hr 81, 186 VE, 138 pvd, 30 pac\par \par \par \par \par On 18 had AVR w 21 mm bovine pericardial Magna valve and repair of secundum ASD\par \par No chest pressure, sob, palpitations, orthopnea, pnd, lh, syncope, edema.\par Notes some new feeling of being off balance\par \par Started on repatha and is tolerating well. \par \par \par \par \par \par Prior studies:\par \par LHC: : mod to severe AS with normal SV, 1v CAD )prox D1) with remaining \par mod CAD\par LM short/nonobstructive\par prox LAD 50% calcified stenosis, mid LAD heavily calcified and diffuse \par disesae with up to 30% stenosis, mid-distal LAD 60% discrete stenosis\par D1 70% prox discrete stenosis, LCx nondominant, prox LCx 50% discrete \par stenosis, mid and distal LCx nonobstructive and 30% diffuse, RCA dominant, \par prox RCA 30%, mid RCA diffuse upto 60% stenosis, distal RCA nonobstructive, \par \par mean LV/Ao gradient 29-32 mmHg, SVi>35 cc/m2\par \par RHC: pap 35/13 (21)\par RV: 34/0/5\par RA 7/6 (4)\par \par Carotid: : no hemodyn signif stenosis\par \par CT structural: : 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 Ao at level pulm mvalve\par 4D CTA, planiimetry of AV > 1cm2 with good leaflet mobility. g\par \par Nuclear : normal perfusion (small fixed apical anterior defect c/w \par attenuation), ekg uninterpretable (went into LBBB)\par \par Holter: 3/18: avg hr 69, 1 pvc, 1 pac\par \par Echo : normal lv size/thickness, AK basal inferoseptum, ef 55%, no pfo \par on bubble, mod to severe AS, peak gradient 62, mean 31, dimensionless index \par .27, AMELIA 1, mac, trace mr/tr/pr, borderline pulm htn pasp 40\par \par \par \par Had chemo (ACT) 8 treatments/XRT (6 weeks) - Last chemo 2 years ago\par \par lumpectomy w LN dissection \par \par Seen by Dr. uQick - echo 2016 with mild to mod AS\par Sent for stress echo 2016 exercised 7:30 minutes to 99% mphr, stopped w HR \par achieved, no ekg changes, normal echo response- normal\par \par Seen by latha  - echo - mild hypertrophy basal IV septum, ef 67%, \par borderline pulm htn, pfo suspected, aneursymal atria, mac, mod AS, mean \par gradient 21, peak gradient 35, AMELIA 1.2, mild to mod tr, trace pr\par \par Sees pyschiatrist\par \par \par Has family h/o HL\par Strong family h/o CVD \par \par \par Walks daily\par Eats healthy diet \par Stress level moderate \par \par Calcium score elevated 2 years ago\par CV MRI performed \par \par \par \par \par PMH/PSH:\par cad\par left breast cancer s/p lumpectomy with LN dissection 2015\par chemo/XRT Left breast\par HL\par aortic stenosis\par cesearean section\par htn\par osteoporosis\par cad\par upper/lower blepharoplasty \par IBS\par preDM\par AVR/ASD repair \par cataract surgery \par \par \par SH:\par tobacco - 10 years history 1ppd, quit \par social etoh\par no drugs\par \par from [place]\par \par 2 sons (35, 29) healthy\par \par \par ALL:\par nkda\par \par \par MEDS:\par toprol xl 100 mg qd\par crestor 40 mg qhs\par anastrazole 1mg qd\par repatha\par asa 81 mg qd\par \par \par \par FH:\par mother -  81 MI, htn\par maternal grandmother -  61 CVA\par maternal aunt -  MI 60's\par father -  accident 27 \par

## 2019-02-25 NOTE — DISCUSSION/SUMMARY
[Patient] : the patient [___ Month(s)] : [unfilled] month(s) [FreeTextEntry1] : 67 y/o female with h/o cad, left breast cancer s/p lumpectomy/XRT/chemo \par 2015, HL, family h/o CVD, AS, preDM, htn who presents for f/up s/p AVR/ASD repair 5/18\par \par -f/up holter 6/18: avg hr 81, 186 VE, 138 pvd, 30 pac\par -will need abx ppx prior to procedures\par -echo 1/19: normal ef 55-60%, basal inferoseptal ak, abnl paradox septal motion c/w post op, bioprosthetic av, peak gradient 13, mean gradient 8, mild mr, no effusion\par -ordered brain MRI and neuro referral\par -ekg 5/18 - nsr, ivd, no st/t changes\par -liver biopsy planned (she will hold asa periprocedure)\par -labs 12/17, 5/18 reviewed\par -Echo 6/18: normal lv size/thickness, abnl paradox septal motion c/w abnl conduction, ef 55%, bioprosthetic av, no ai, peak gradient 12, mean gradient 7, mac, trace mr, mild to mod tr, pasp 25, small effusion adj to RV \par -LHC: 2/18: mod to severe AS with normal SV, 1v CAD )prox D1) with remaining \par mod CAD, LM short/nonobstructive\par prox LAD 50% calcified stenosis, mid LAD heavily calcified and diffuse \par disesae with up to 30% stenosis, mid-distal LAD 60% discrete stenosis\par D1 70% prox discrete stenosis, LCx nondominant, prox LCx 50% discrete \par stenosis, mid and distal LCx nonobstructive and 30% diffuse, RCA dominant, \par prox RCA 30%, mid RCA diffuse upto 60% stenosis, distal RCA nonobstructive, \par mean LV/Ao gradient 29-32 mmHg, SVi>35 cc/m2\par RHC: pap 35/13 (21)\par RV: 34/0/5\par RA 7/6 (4)\par \par -Carotid: 2/18: no hemodyn signif stenosis\par -CT structural: 2/18: 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 \par -Nuclear 2/18: normal perfusion (small fixed apical anterior defect c/w attenuation), ekg uninterpretable (went into LBBB)\par -Holter: 3/18: avg hr 69, 1 pvc, 1 pac\par -Echo 2/18: normal lv size/thickness, AK basal inferoseptum, ef 55%, no pfo on bubble, mod to severe AS, peak gradient 62, mean 31, dimensionless index.27, AMELIA 1, mac, trace mr/tr/pr, borderline pulm htn pasp 40\par -continue toprol \par -continue asa, statin, repatha\par -start norvasc 5 mg qd\par -blood sugar monitoring for predm\par -counseled on diet, exercise, stress\par -f/up 1 month\par

## 2019-03-09 ENCOUNTER — TRANSCRIPTION ENCOUNTER (OUTPATIENT)
Age: 69
End: 2019-03-09